# Patient Record
Sex: MALE | Race: BLACK OR AFRICAN AMERICAN | Employment: PART TIME | URBAN - METROPOLITAN AREA
[De-identification: names, ages, dates, MRNs, and addresses within clinical notes are randomized per-mention and may not be internally consistent; named-entity substitution may affect disease eponyms.]

---

## 2017-05-01 PROBLEM — D57.00 SICKLE CELL CRISIS (HCC): Status: ACTIVE | Noted: 2017-05-01

## 2017-08-04 PROBLEM — D57.00 SICKLE CELL PAIN CRISIS (HCC): Status: ACTIVE | Noted: 2017-08-04

## 2017-08-04 PROBLEM — M87.052 AVASCULAR NECROSIS OF BONE OF LEFT HIP (HCC): Status: ACTIVE | Noted: 2017-08-04

## 2018-05-31 PROBLEM — D57.00 SICKLE CELL ANEMIA WITH CRISIS (HCC): Status: ACTIVE | Noted: 2018-05-31

## 2019-03-18 ENCOUNTER — OFFICE VISIT (OUTPATIENT)
Dept: FAMILY MEDICINE CLINIC | Age: 24
End: 2019-03-18

## 2019-03-18 VITALS
OXYGEN SATURATION: 100 % | DIASTOLIC BLOOD PRESSURE: 66 MMHG | BODY MASS INDEX: 23.82 KG/M2 | WEIGHT: 143 LBS | HEART RATE: 82 BPM | HEIGHT: 65 IN | RESPIRATION RATE: 16 BRPM | TEMPERATURE: 98.7 F | SYSTOLIC BLOOD PRESSURE: 123 MMHG

## 2019-03-18 DIAGNOSIS — R21 SKIN RASH: ICD-10-CM

## 2019-03-18 DIAGNOSIS — R19.7 DIARRHEA, UNSPECIFIED TYPE: Primary | ICD-10-CM

## 2019-03-18 DIAGNOSIS — R19.7 DIARRHEA, UNSPECIFIED TYPE: ICD-10-CM

## 2019-03-18 DIAGNOSIS — M54.50 CHRONIC RIGHT-SIDED LOW BACK PAIN WITHOUT SCIATICA: ICD-10-CM

## 2019-03-18 DIAGNOSIS — Z11.4 ENCOUNTER FOR SCREENING FOR HIV: ICD-10-CM

## 2019-03-18 DIAGNOSIS — G89.29 CHRONIC RIGHT-SIDED LOW BACK PAIN WITHOUT SCIATICA: ICD-10-CM

## 2019-03-18 LAB
HIV -1/0/2 AG/AB WITH REFLEX, 13463: NON REACTIVE
HIV 1 & 2 AB SER-IMP: NORMAL

## 2019-03-18 RX ORDER — METHOCARBAMOL 500 MG/1
500 TABLET, FILM COATED ORAL 3 TIMES DAILY
Qty: 60 TAB | Refills: 0 | Status: SHIPPED | OUTPATIENT
Start: 2019-03-18 | End: 2019-06-18 | Stop reason: SDUPTHER

## 2019-03-18 RX ORDER — HYDROXYUREA 500 MG/1
500 CAPSULE ORAL DAILY
COMMUNITY
Start: 2019-03-04

## 2019-03-18 RX ORDER — LEVOCETIRIZINE DIHYDROCHLORIDE 5 MG/1
5 TABLET, FILM COATED ORAL DAILY
Qty: 30 TAB | Refills: 1 | Status: SHIPPED | OUTPATIENT
Start: 2019-03-18 | End: 2019-12-23

## 2019-03-18 RX ORDER — CLOBETASOL PROPIONATE 0.5 MG/G
OINTMENT TOPICAL 2 TIMES DAILY
Qty: 45 G | Refills: 0 | Status: SHIPPED | OUTPATIENT
Start: 2019-03-18 | End: 2019-12-23

## 2019-03-18 RX ORDER — NAPROXEN 500 MG/1
500 TABLET ORAL 2 TIMES DAILY WITH MEALS
Qty: 60 TAB | Refills: 0 | Status: SHIPPED | OUTPATIENT
Start: 2019-03-18 | End: 2019-12-23

## 2019-03-18 RX ORDER — FOLIC ACID 1 MG/1
1 TABLET ORAL DAILY
Refills: 4 | COMMUNITY
Start: 2019-02-28

## 2019-03-18 NOTE — PROGRESS NOTES
No chief complaint on file. Denny Velasquez is a 22y.o. year old male  With History of Sickle Cells Disease who present today with c/o Diarrhea x 1 month; has 4 to 5 liquid stools a day. Has cramps in the mornings. Denies blood in stools. States that it started out of nowhere. States that he wears long sleeves at work. Also c/o rash on Left Arm x two to three week. States that he does not remember been bitten by an insect. Rash itches a lot. States that it started as a small pimple and is now bigger. States that pain comes and goes. Denies injury. C/o Right lower back pain. This is a chronic problem. Pain is intermittent and does not radiate. Denies Injury. Sometimes sitting too long makes symptoms worse. Would like to be tested for HIV. Previous PCP: Has not had a PCP for a long time. Sees Dr. Catrachito Mcgarry, Hem/Onc for Sickle Cell Disease. Med review done  Medical Hx updated in the chart      Review of Systems:   Pt denies: Wt loss, Fever/Chills, HA, Visual changes, Fatigue, Chest pain, SOB, OH, Abd pain, N/V/D/C, Blood in stool or urine, Edema. Pertinent positive as above in HPI. All others negative. Current Outpatient Medications   Medication Sig Dispense Refill    hydroxyurea (HYDREA) 500 mg capsule       folic acid (FOLVITE) 1 mg tablet TAKE 1 TABLET ORALLY EVERY DAY. 4        There is no problem list on file for this patient. Past Medical History:   Diagnosis Date    Headache     Heart murmur 2013       History reviewed. No pertinent surgical history. No Known Allergies    Family History   Problem Relation Age of Onset    Diabetes Maternal Grandfather     Alzheimer Maternal Grandfather        Objective:     Vitals:    03/18/19 1511   BP: 123/66   Pulse: 82   Resp: 16   Temp: 98.7 °F (37.1 °C)   TempSrc: Oral   SpO2: 100%   Weight: 143 lb (64.9 kg)   Height: 5' 5\" (1.651 m)     Body mass index is 23.8 kg/m².     Exam:   Appearance: alert, well appearing, in no respiratory distress and well hydrated. HEENT:  NC/AT, TMs clear, Posterior Pharynx nl, pink conjunctivae, anicteric sclerae  Neck:  No cervical lymphadenopathy, no JVD, no thyromegaly  Heart:  RRR without M/R/G  Lungs:  CTAB, no rhonchi, rales, or wheezes with good air exchange  Abdomen: soft, normoactive BS, non tender, no organomegaly, no palpable mass   Ext:  No C/C/E   Skin: Large raised and red rash on inner aspect of left upper arm;   Neuro:AAO x 3, no focal deficits    Assessment/ Plan:     1. Diarrhea, unspecified type  - Stay hydrated. - CULTURE, STOOL; Future  - OVA & PARASITES, STOOL; Future    2. Skin rash  _ apply as prescribed twice a day right after shower. - clobetasol (TEMOVATE) 0.05 % ointment; Apply  to affected area two (2) times a day. Dispense: 45 g; Refill: 0  - CBC WITH AUTOMATED DIFF; Future  - METABOLIC PANEL, COMPREHENSIVE; Future  - SED RATE (ESR); Future  - levocetirizine (XYZAL) 5 mg tablet; Take 1 Tab by mouth daily. Dispense: 30 Tab; Refill: 1    3. Chronic right-sided low back pain without sciatica  - methocarbamol (ROBAXIN) 500 mg tablet; Take 1 Tab by mouth three (3) times daily. Dispense: 60 Tab; Refill: 0  - naproxen (NAPROSYN) 500 mg tablet; Take 1 Tab by mouth two (2) times daily (with meals). Dispense: 60 Tab; Refill: 0    4. Encounter for screening for HIV  - HIV 1/2 AG/AB, 4TH GENERATION,W RFLX CONFIRM; Future    I have discussed the diagnosis with the patient and the intended plan as seen in the above orders. The patient has received an after-visit summary and questions were answered concerning future plans. I have discussed medication side effects and warnings with the patient as well. I have reviewed the plan of care with the patient, accepted their input and they are in agreement with the treatment goals.      Follow-up Disposition: Not on File  Rosine Nadia Riedel, PARDEEP

## 2019-03-18 NOTE — PROGRESS NOTES
Alex Hedrick is a 22 y.o. male  Stomach pain with diarrhea  x 1 month , rash x 1 week on left arm, itchy. Also having lower back pain with no injury noted. Not seen a  PCP, but does go to hematologist. Dr. Emmie Nazario on [facility] oncology has appointment in may to See him. Dx Sickle cell.

## 2019-03-19 LAB
A-G RATIO,AGRAT: 2.2 RATIO (ref 1.1–2.6)
ABSOLUTE LYMPHOCYTE COUNT, 10803: 0.8 K/UL (ref 1–4.8)
ALBUMIN SERPL-MCNC: 5 G/DL (ref 3.5–5)
ALP SERPL-CCNC: 68 U/L (ref 25–115)
ALT SERPL-CCNC: 39 U/L (ref 5–40)
ANION GAP SERPL CALC-SCNC: 15 MMOL/L
ANISOCYTOSIS: ABNORMAL
AST SERPL W P-5'-P-CCNC: 34 U/L (ref 10–37)
BASOPHILS # BLD: 0 K/UL (ref 0–0.2)
BASOPHILS NFR BLD: 0 % (ref 0–2)
BILIRUB SERPL-MCNC: 0.8 MG/DL (ref 0.2–1.2)
BUN SERPL-MCNC: 11 MG/DL (ref 6–22)
CALCIUM SERPL-MCNC: 9.9 MG/DL (ref 8.4–10.4)
CHLORIDE SERPL-SCNC: 104 MMOL/L (ref 98–110)
CO2 SERPL-SCNC: 25 MMOL/L (ref 20–32)
CREAT SERPL-MCNC: 0.9 MG/DL (ref 0.5–1.2)
EOSINOPHIL # BLD: 0 K/UL (ref 0–0.5)
EOSINOPHIL NFR BLD: 1 % (ref 0–6)
ERYTHROCYTE [DISTWIDTH] IN BLOOD BY AUTOMATED COUNT: 18.8 % (ref 10–15.5)
GFRAA, 66117: >60
GFRNA, 66118: >60
GLOBULIN,GLOB: 2.3 G/DL (ref 2–4)
GLUCOSE SERPL-MCNC: 88 MG/DL (ref 70–99)
GRANULOCYTES,GRANS: 52 % (ref 40–75)
HCT VFR BLD AUTO: 33.9 % (ref 36.6–51.9)
HGB BLD-MCNC: 11.7 G/DL (ref 13.2–17.3)
LYMPHOCYTES, LYMLT: 33 % (ref 20–45)
MCH RBC QN AUTO: 32 PG (ref 26–34)
MCHC RBC AUTO-ENTMCNC: 35 G/DL (ref 31–36)
MCV RBC AUTO: 92 FL (ref 80–95)
MONOCYTES # BLD: 0.3 K/UL (ref 0.1–1)
MONOCYTES NFR BLD: 13 % (ref 3–12)
NEUTROPHILS # BLD AUTO: 1.3 K/UL (ref 1.8–7.7)
NORMACHROMIC RBC, 868: ABNORMAL
NORMACYTIC RBC, 869: ABNORMAL
OVALOCYTES, 1119: ABNORMAL
PATH REVIEW OF SMEAR, 12050: NORMAL
PLATELET # BLD AUTO: 94 K/UL (ref 140–440)
POTASSIUM SERPL-SCNC: 4.5 MMOL/L (ref 3.5–5.5)
PROT SERPL-MCNC: 7.3 G/DL (ref 6.4–8.3)
RBC # BLD AUTO: 3.69 M/UL (ref 3.8–5.8)
RBC FRAGMENTS, 1123: ABNORMAL
SED RATE (ESR): 8 MM/HR (ref 0–15)
SMEAR EVAL, 1131: ABNORMAL
SODIUM SERPL-SCNC: 144 MMOL/L (ref 133–145)
WBC # BLD AUTO: 2.4 K/UL (ref 4–11)

## 2019-03-20 LAB
RESULT: NORMAL
SHIGA TOXIN EIA, 16335: NEGATIVE

## 2019-03-21 LAB — RESULT: NORMAL

## 2019-03-25 LAB — RESULT: NORMAL

## 2019-03-27 NOTE — PROGRESS NOTES
Please let patient know that stool was negative for bacteria. I do not know why he is having diarrhea. If he wants and still have symptoms, he can be referred to GI. Please verify that Dr. Kate Pinto is his hematologist then fax CBC result to him. Thank you.

## 2019-04-19 ENCOUNTER — DOCUMENTATION ONLY (OUTPATIENT)
Dept: FAMILY MEDICINE CLINIC | Age: 24
End: 2019-04-19

## 2019-04-19 NOTE — PROGRESS NOTES
Patient called for lab results, results given to patient, patient voiced understanding. Informed patient of possible referral and patient declined but states that he is still experiencing diarrhea with blood in stool. Patient prefers to follow up with provider [facility] in June.

## 2019-06-16 DIAGNOSIS — R21 SKIN RASH: ICD-10-CM

## 2019-06-18 ENCOUNTER — OFFICE VISIT (OUTPATIENT)
Dept: FAMILY MEDICINE CLINIC | Age: 24
End: 2019-06-18

## 2019-06-18 VITALS
RESPIRATION RATE: 18 BRPM | SYSTOLIC BLOOD PRESSURE: 114 MMHG | HEART RATE: 68 BPM | TEMPERATURE: 98 F | OXYGEN SATURATION: 99 % | HEIGHT: 65 IN | DIASTOLIC BLOOD PRESSURE: 68 MMHG | WEIGHT: 142 LBS | BODY MASS INDEX: 23.66 KG/M2

## 2019-06-18 DIAGNOSIS — G89.29 CHRONIC RIGHT-SIDED LOW BACK PAIN WITHOUT SCIATICA: Primary | ICD-10-CM

## 2019-06-18 DIAGNOSIS — K92.1 BLOOD IN THE STOOL: ICD-10-CM

## 2019-06-18 DIAGNOSIS — R19.7 DIARRHEA, UNSPECIFIED TYPE: ICD-10-CM

## 2019-06-18 DIAGNOSIS — M54.50 CHRONIC RIGHT-SIDED LOW BACK PAIN WITHOUT SCIATICA: Primary | ICD-10-CM

## 2019-06-18 RX ORDER — METHOCARBAMOL 500 MG/1
1000 TABLET, FILM COATED ORAL 3 TIMES DAILY
Qty: 90 TAB | Refills: 0 | Status: SHIPPED | OUTPATIENT
Start: 2019-06-18

## 2019-06-18 NOTE — PATIENT INSTRUCTIONS
Diarrhea: Care Instructions  Your Care Instructions    Diarrhea is loose, watery stools (bowel movements). The exact cause is often hard to find. Sometimes diarrhea is your body's way of getting rid of what caused an upset stomach. Viruses, food poisoning, and many medicines can cause diarrhea. Some people get diarrhea in response to emotional stress, anxiety, or certain foods. Almost everyone has diarrhea now and then. It usually isn't serious, and your stools will return to normal soon. The important thing to do is replace the fluids you have lost, so you can prevent dehydration. The doctor has checked you carefully, but problems can develop later. If you notice any problems or new symptoms, get medical treatment right away. Follow-up care is a key part of your treatment and safety. Be sure to make and go to all appointments, and call your doctor if you are having problems. It's also a good idea to know your test results and keep a list of the medicines you take. How can you care for yourself at home? · Watch for signs of dehydration, which means your body has lost too much water. Dehydration is a serious condition and should be treated right away. Signs of dehydration are:  ? Increasing thirst and dry eyes and mouth. ? Feeling faint or lightheaded. ? Darker urine, and a smaller amount of urine than normal.  · To prevent dehydration, drink plenty of fluids, enough so that your urine is light yellow or clear like water. Choose water and other caffeine-free clear liquids until you feel better. If you have kidney, heart, or liver disease and have to limit fluids, talk with your doctor before you increase the amount of fluids you drink. · Begin eating small amounts of mild foods the next day, if you feel like it. ? Try yogurt that has live cultures of Lactobacillus. (Check the label.)  ? Avoid spicy foods, fruits, alcohol, and caffeine until 48 hours after all symptoms are gone. ?  Avoid chewing gum that contains sorbitol. ? Avoid dairy products (except for yogurt with Lactobacillus) while you have diarrhea and for 3 days after symptoms are gone. · The doctor may recommend that you take over-the-counter medicine, such as loperamide (Imodium), if you still have diarrhea after 6 hours. Read and follow all instructions on the label. Do not use this medicine if you have bloody diarrhea, a high fever, or other signs of serious illness. Call your doctor if you think you are having a problem with your medicine. When should you call for help? Call 911 anytime you think you may need emergency care. For example, call if:    · You passed out (lost consciousness).     · Your stools are maroon or very bloody.    Call your doctor now or seek immediate medical care if:    · You are dizzy or lightheaded, or you feel like you may faint.     · Your stools are black and look like tar, or they have streaks of blood.     · You have new or worse belly pain.     · You have symptoms of dehydration, such as:  ? Dry eyes and a dry mouth. ? Passing only a little dark urine. ? Feeling thirstier than usual.     · You have a new or higher fever.    Watch closely for changes in your health, and be sure to contact your doctor if:    · Your diarrhea is getting worse.     · You see pus in the diarrhea.     · You are not getting better after 2 days (48 hours). Where can you learn more? Go to http://rakesh-nohemi.info/. Enter Z186 in the search box to learn more about \"Diarrhea: Care Instructions. \"  Current as of: September 23, 2018  Content Version: 11.9  © 0405-0539 [employer]. Care instructions adapted under license by [employer] (which disclaims liability or warranty for this information).  If you have questions about a medical condition or this instruction, always ask your healthcare professional. [address] any warranty or liability for your use of this information. Upper Gastrointestinal Bleeding: Care Instructions  Your Care Instructions    The digestive or gastrointestinal tract goes from the mouth to the anus. It is often called the GI tract. Bleeding in the upper GI tract can happen anywhere from the esophagus to the first part of the small intestine. Sometimes it's caused by an ulcer in your stomach. Or it may be caused by blood vessels in your esophagus. Your esophagus is the tube that carries food from your throat to your stomach. Light bleeding may not cause any symptoms at first. But if you continue to bleed for a while, you may feel very weak or tired. Sudden, heavy bleeding means you need to see a doctor right away. This kind of bleeding can be very dangerous. But it can usually be cured or controlled. The doctor may do some tests to find the cause of your bleeding. Follow-up care is a key part of your treatment and safety. Be sure to make and go to all appointments, and call your doctor if you are having problems. It's also a good idea to know your test results and keep a list of the medicines you take. How can you care for yourself at home? · Be safe with medicines. Take your medicines exactly as prescribed. Call your doctor if you think you are having a problem with your medicine. You will get more details on the specific medicines your doctor prescribes. · Do not take aspirin or other anti-inflammatory medicines, such as naproxen (Aleve) or ibuprofen (Advil, Motrin), without talking to your doctor first. Ask your doctor if it is okay to use acetaminophen (Tylenol). · Do not drink alcohol. · The bleeding may make you lose iron. So it's important to eat foods that have a lot of iron. These include red meat, shellfish, poultry, and eggs. They also include beans, raisins, whole-grain breads, and leafy green vegetables. If you want help planning meals, you can meet with a dietitian. When should you call for help?   Call 911 anytime you think you may need emergency care. For example, call if:    · You have sudden, severe belly pain.     · You vomit blood or what looks like coffee grounds.     · You passed out (lost consciousness).     · Your stools are maroon or very bloody.    Call your doctor now or seek immediate medical care if:    · You are dizzy or lightheaded, or you feel like you may faint.     · Your stools are black and look like tar.     · You have belly pain.     · You vomit or have nausea.     · You have trouble swallowing, or it hurts when you swallow.    Watch closely for changes in your health, and be sure to contact your doctor if you do not get better as expected. Where can you learn more? Go to http://rakesh-nohemi.info/. Enter X388 in the search box to learn more about \"Upper Gastrointestinal Bleeding: Care Instructions. \"  Current as of: September 23, 2018  Content Version: 11.9  © 2675-7533 [employer]. Care instructions adapted under license by [employer] (which disclaims liability or warranty for this information). If you have questions about a medical condition or this instruction, always ask your healthcare professional. [address] any warranty or liability for your use of this information.

## 2019-06-23 NOTE — PROGRESS NOTES
Patient: Jolly Kruse  Date of Service: 6/23/2019   Provider: PARDEEP Black     REASON FOR VISIT:   Chief Complaint   Patient presents with    Diarrhea        HISTORY OF PRESENT ILLNESS:   Jolly Kruse is a 22 y.o. male who presents to the office for follow up for Diarrhea, intermittent black stools. All tests three months ago were negative. Symptoms are not worse than before. Also c/o Left back/hip pain. This is a chronic problem and is intermittent. Denies saddle paresthesia. Has a history of SCA and is followed by Dr. Sachin Johnson, Hematology. ALLERGIES:   No Known Allergies     ACTIVE MEDICAL PROBLEMS:  Patient Active Problem List   Diagnosis Code    Sickle cell crisis ([facility] D57.00    Sickle cell pain crisis ([facility] D57.00    Avascular necrosis of bone of left hip (HCC) M87.052    Sickle cell anemia with crisis ([facility] D57.00        MEDICATIONS:   Current Outpatient Medications   Medication Sig Dispense Refill    methocarbamol (ROBAXIN) 500 mg tablet Take 2 Tabs by mouth three (3) times daily. 90 Tab 0    hydroxyurea (HYDREA) 500 mg capsule       folic acid (FOLVITE) 1 mg tablet TAKE 1 TABLET ORALLY EVERY DAY. 4    naproxen (NAPROSYN) 500 mg tablet Take 1 Tab by mouth two (2) times daily (with meals). 60 Tab 0    clobetasol (TEMOVATE) 0.05 % ointment Apply  to affected area two (2) times a day. 45 g 0    levocetirizine (XYZAL) 5 mg tablet Take 1 Tab by mouth daily. 30 Tab 1    oxyCODONE IR (ROXICODONE) 20 mg immediate release tablet Take 1 Tab by mouth every eight (8) hours as needed for Pain. Max Daily Amount: 60 mg. 6 Tab 0    cyanocobalamin (VITAMIN B12) 2,500 mcg sublingual tablet Take 1 Tab by mouth daily. 100 Tab 3    ergocalciferol (ERGOCALCIFEROL) 50,000 unit capsule Take 1 Cap by mouth every seven (7) days. 4 Cap 3    senna-docusate (PERICOLACE) 8.6-50 mg per tablet Take 2 Tabs by mouth nightly.  60 Tab 3    polyethylene glycol (MIRALAX) 17 gram packet Take 1 Packet by mouth daily. 30 Packet 0    folic acid (FOLVITE) 1 mg tablet Take 1 mg by mouth daily.  famotidine (PEPCID) 20 mg tablet Take 1 Tab by mouth two (2) times a day. 60 Tab 0        ROS:   Pertinent positive as above in HPI. All others negative. PHYSICAL EXAMINATION:  Visit Vitals  /68 (BP 1 Location: Left arm, BP Patient Position: Sitting)   Pulse 68   Temp 98 °F (36.7 °C) (Oral)   Resp 18   Ht 5' 5\" (1.651 m)   Wt 142 lb (64.4 kg)   SpO2 99%   BMI 23.63 kg/m²      Body mass index is 23.63 kg/m². Appearance: alert, well appearing, and in no distress. Neck supple. No adenopathy or thyromegaly. Lungs are clear, good air entry, no wheezes, rhonchi or rales. S1 and S2 normal, no murmurs, regular rate and rhythm. Abdomen soft without tenderness, guarding, mass or organomegaly. No bruit. Extremities show no edema, normal peripheral pulses. Neurological is normal, no focal findings. ASSESSMENT/PLAN:  Diagnoses and all orders for this visit:    1. Chronic right-sided low back pain without sciatica  -     REFERRAL TO PHYSICAL THERAPY  -     methocarbamol (ROBAXIN) 500 mg tablet; Take 2 Tabs by mouth three (3) times daily. 2. Diarrhea, unspecified type  -     REFERRAL TO GASTROENTEROLOGY    3. Blood in the stool  -     REFERRAL TO GASTROENTEROLOGY    Will referred to Pain Management if pain is not helped by PT and Muscke relaxer. Patient advised to return to clinic if symptoms persist or to ED if they become worse  Patient verbalized understanding to above instructions. Follow-up and Dispositions    · Return if symptoms worsen or fail to improve.           PARDEEP Alfaro   6/23/2019   8:32 AM

## 2019-07-16 ENCOUNTER — APPOINTMENT (OUTPATIENT)
Dept: PHYSICAL THERAPY | Age: 24
End: 2019-07-16

## 2019-12-23 PROBLEM — R10.9 ABDOMINAL PAIN: Status: ACTIVE | Noted: 2019-12-23

## 2019-12-23 PROBLEM — R07.9 CHEST PAIN: Status: ACTIVE | Noted: 2019-12-23

## 2020-02-04 PROBLEM — R07.9 ACUTE CHEST PAIN: Status: ACTIVE | Noted: 2020-02-04

## 2020-02-09 PROBLEM — R07.9 ACUTE CHEST PAIN: Status: RESOLVED | Noted: 2020-02-04 | Resolved: 2020-02-09

## 2020-03-10 ENCOUNTER — HOSPITAL ENCOUNTER (EMERGENCY)
Age: 25
Discharge: LEFT BEFORE DISCHARGE | End: 2020-03-10
Payer: COMMERCIAL

## 2020-03-10 ENCOUNTER — APPOINTMENT (OUTPATIENT)
Dept: GENERAL RADIOLOGY | Age: 25
End: 2020-03-10
Attending: PHYSICIAN ASSISTANT
Payer: COMMERCIAL

## 2020-03-10 VITALS
SYSTOLIC BLOOD PRESSURE: 126 MMHG | TEMPERATURE: 98.6 F | DIASTOLIC BLOOD PRESSURE: 82 MMHG | HEART RATE: 77 BPM | RESPIRATION RATE: 16 BRPM | OXYGEN SATURATION: 100 %

## 2020-03-10 PROCEDURE — 75810000275 HC EMERGENCY DEPT VISIT NO LEVEL OF CARE

## 2020-03-10 PROCEDURE — 71045 X-RAY EXAM CHEST 1 VIEW: CPT

## 2020-03-10 PROCEDURE — 93005 ELECTROCARDIOGRAM TRACING: CPT

## 2020-03-11 PROBLEM — D57.1 SICKLE CELL ANEMIA (HCC): Status: ACTIVE | Noted: 2020-03-11

## 2020-03-11 LAB
ATRIAL RATE: 78 BPM
CALCULATED P AXIS, ECG09: 74 DEGREES
CALCULATED R AXIS, ECG10: 64 DEGREES
CALCULATED T AXIS, ECG11: 45 DEGREES
DIAGNOSIS, 93000: NORMAL
P-R INTERVAL, ECG05: 138 MS
Q-T INTERVAL, ECG07: 332 MS
QRS DURATION, ECG06: 78 MS
QTC CALCULATION (BEZET), ECG08: 378 MS
VENTRICULAR RATE, ECG03: 78 BPM

## 2020-04-29 ENCOUNTER — HOSPITAL ENCOUNTER (EMERGENCY)
Age: 25
Discharge: HOME | End: 2020-04-30
Attending: EMERGENCY MEDICINE
Payer: COMMERCIAL

## 2020-04-29 ENCOUNTER — APPOINTMENT (OUTPATIENT)
Dept: GENERAL RADIOLOGY | Age: 25
End: 2020-04-29
Attending: EMERGENCY MEDICINE
Payer: COMMERCIAL

## 2020-04-29 DIAGNOSIS — R07.82 INTERCOSTAL PAIN: Primary | ICD-10-CM

## 2020-04-29 LAB
ALBUMIN SERPL-MCNC: 4.2 G/DL (ref 3.4–5)
ALBUMIN/GLOB SERPL: 1.2 {RATIO} (ref 0.8–1.7)
ALP SERPL-CCNC: 71 U/L (ref 45–117)
ALT SERPL-CCNC: 21 U/L (ref 16–61)
ANION GAP SERPL CALC-SCNC: 9 MMOL/L (ref 3–18)
AST SERPL-CCNC: 23 U/L (ref 10–38)
BASOPHILS # BLD: 0 K/UL (ref 0–0.1)
BASOPHILS NFR BLD: 0 % (ref 0–2)
BILIRUB SERPL-MCNC: 0.7 MG/DL (ref 0.2–1)
BUN SERPL-MCNC: 9 MG/DL (ref 7–18)
BUN/CREAT SERPL: 9 (ref 12–20)
CALCIUM SERPL-MCNC: 8.5 MG/DL (ref 8.5–10.1)
CHLORIDE SERPL-SCNC: 109 MMOL/L (ref 100–111)
CK MB CFR SERPL CALC: NORMAL % (ref 0–4)
CK MB SERPL-MCNC: <1 NG/ML (ref 5–25)
CK SERPL-CCNC: 126 U/L (ref 39–308)
CO2 SERPL-SCNC: 25 MMOL/L (ref 21–32)
CREAT SERPL-MCNC: 0.96 MG/DL (ref 0.6–1.3)
DIFFERENTIAL METHOD BLD: ABNORMAL
EOSINOPHIL # BLD: 0.1 K/UL (ref 0–0.4)
EOSINOPHIL NFR BLD: 2 % (ref 0–5)
ERYTHROCYTE [DISTWIDTH] IN BLOOD BY AUTOMATED COUNT: 18.8 % (ref 11.6–14.5)
GLOBULIN SER CALC-MCNC: 3.5 G/DL (ref 2–4)
GLUCOSE SERPL-MCNC: 101 MG/DL (ref 74–99)
HCT VFR BLD AUTO: 28.8 % (ref 36–48)
HGB BLD-MCNC: 9.8 G/DL (ref 13–16)
LYMPHOCYTES # BLD: 0.8 K/UL (ref 0.9–3.6)
LYMPHOCYTES NFR BLD: 21 % (ref 21–52)
MCH RBC QN AUTO: 26.7 PG (ref 24–34)
MCHC RBC AUTO-ENTMCNC: 34 G/DL (ref 31–37)
MCV RBC AUTO: 78.5 FL (ref 74–97)
MONOCYTES # BLD: 0.3 K/UL (ref 0.05–1.2)
MONOCYTES NFR BLD: 7 % (ref 3–10)
NEUTS SEG # BLD: 2.4 K/UL (ref 1.8–8)
NEUTS SEG NFR BLD: 70 % (ref 40–73)
PLATELET # BLD AUTO: 109 K/UL (ref 135–420)
PLATELET COMMENTS,PCOM: ABNORMAL
POTASSIUM SERPL-SCNC: 3.7 MMOL/L (ref 3.5–5.5)
PROT SERPL-MCNC: 7.7 G/DL (ref 6.4–8.2)
RBC # BLD AUTO: 3.67 M/UL (ref 4.7–5.5)
RBC MORPH BLD: ABNORMAL
SODIUM SERPL-SCNC: 143 MMOL/L (ref 136–145)
TROPONIN I SERPL-MCNC: <0.02 NG/ML (ref 0–0.04)
WBC # BLD AUTO: 3.6 K/UL (ref 4.6–13.2)

## 2020-04-29 PROCEDURE — 74011250636 HC RX REV CODE- 250/636: Performed by: EMERGENCY MEDICINE

## 2020-04-29 PROCEDURE — 96374 THER/PROPH/DIAG INJ IV PUSH: CPT

## 2020-04-29 PROCEDURE — 85025 COMPLETE CBC W/AUTO DIFF WBC: CPT

## 2020-04-29 PROCEDURE — 93005 ELECTROCARDIOGRAM TRACING: CPT

## 2020-04-29 PROCEDURE — 80053 COMPREHEN METABOLIC PANEL: CPT

## 2020-04-29 PROCEDURE — 99284 EMERGENCY DEPT VISIT MOD MDM: CPT

## 2020-04-29 PROCEDURE — 71046 X-RAY EXAM CHEST 2 VIEWS: CPT

## 2020-04-29 PROCEDURE — 82550 ASSAY OF CK (CPK): CPT

## 2020-04-29 RX ORDER — KETOROLAC TROMETHAMINE 15 MG/ML
15 INJECTION, SOLUTION INTRAMUSCULAR; INTRAVENOUS ONCE
Status: COMPLETED | OUTPATIENT
Start: 2020-04-29 | End: 2020-04-29

## 2020-04-29 RX ADMIN — KETOROLAC TROMETHAMINE 15 MG: 15 INJECTION, SOLUTION INTRAMUSCULAR; INTRAVENOUS at 22:37

## 2020-04-29 NOTE — LETTER
[address] [place] [address] [address] [facility] EMERGENCY DEPT 
[id number] [facility] [id number] [phone] Work/School Note Date: 4/29/2020 To Whom It May concern: 
 
Jeri Richardson was seen and treated today in the emergency room by the following provider(s): 
Attending Provider: Gene José MD.   
 
Jeri Richardson may return to work on May 1, 2020. Sincerely, Sarika Sanches RN

## 2020-04-29 NOTE — ED TRIAGE NOTES
Patient states chest pain with difficulty breathing,  States It could be\" when I asked if it was his sickle cell,  States it is random

## 2020-04-30 VITALS
HEART RATE: 69 BPM | BODY MASS INDEX: 21.63 KG/M2 | WEIGHT: 130 LBS | TEMPERATURE: 97.8 F | DIASTOLIC BLOOD PRESSURE: 56 MMHG | OXYGEN SATURATION: 100 % | RESPIRATION RATE: 18 BRPM | SYSTOLIC BLOOD PRESSURE: 118 MMHG

## 2020-04-30 LAB
ATRIAL RATE: 92 BPM
CALCULATED P AXIS, ECG09: 81 DEGREES
CALCULATED R AXIS, ECG10: 56 DEGREES
CALCULATED T AXIS, ECG11: 31 DEGREES
DIAGNOSIS, 93000: NORMAL
P-R INTERVAL, ECG05: 128 MS
Q-T INTERVAL, ECG07: 334 MS
QRS DURATION, ECG06: 70 MS
QTC CALCULATION (BEZET), ECG08: 413 MS
VENTRICULAR RATE, ECG03: 92 BPM

## 2020-04-30 NOTE — ED PROVIDER NOTES
EMERGENCY DEPARTMENT HISTORY AND PHYSICAL EXAM    6:28 AM  Date: 4/29/2020  Patient Name: Gladstone Paget    History of Presenting Illness     Chief Complaint   Patient presents with    Chest Pain        History Provided By: Patient    HPI: Gladstone Paget is a 26 y.o. male with history of sickle cell. Patient is presenting with recurrent chest pain and tenderness to palpation. No history of shortness of breath, no fever cough or chills. Patient was evaluated multiple times in the past for his chest pain and was told is not cardiac. No PE risk factors. 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 No pertinent surgical history. Family History:  Family History   Problem Relation Age of Onset    Diabetes Maternal Grandfather     Alzheimer Maternal Grandfather     Sickle Cell Trait Mother     Sickle Cell Trait Father        Social History:  Social History     Tobacco Use    Smoking status: Never Smoker    Smokeless tobacco: Never Used   Substance Use Topics    Alcohol use: No     Frequency: Never    Drug use: No       Allergies:  No Known Allergies    Review of Systems   Review of Systems   Cardiovascular: Positive for chest pain. All other systems reviewed and are negative. Physical Exam     Patient Vitals for the past 12 hrs:   Temp Pulse Resp BP SpO2   04/30/20 0030 97.8 °F (36.6 °C) 69 18 118/56 100 %   04/29/20 1959 98.4 °F (36.9 °C) 87 19 114/64 100 %       Physical Exam  Vitals signs and nursing note reviewed. Constitutional:       Appearance: He is well-developed. HENT:      Head: Normocephalic and atraumatic.    Eyes:      Extraocular Movements: Extraocular movements intact. Neck:      Musculoskeletal: Neck supple. Cardiovascular:      Rate and Rhythm: Normal rate. Pulmonary:      Effort: Pulmonary effort is normal. No tachypnea. Chest:      Chest wall: Tenderness present. Musculoskeletal: Normal range of motion. Skin:     General: Skin is warm and dry. Neurological:      General: No focal deficit present. Mental Status: He is alert and oriented to person, place, and time.    Psychiatric:         Mood and Affect: Mood normal.         Behavior: Behavior normal.         Diagnostic Study Results     Labs -  Recent Results (from the past 12 hour(s))   EKG, 12 LEAD, INITIAL    Collection Time: 04/29/20  7:40 PM   Result Value Ref Range    Ventricular Rate 92 BPM    Atrial Rate 92 BPM    P-R Interval 128 ms    QRS Duration 70 ms    Q-T Interval 334 ms    QTC Calculation (Bezet) 413 ms    Calculated P Axis 81 degrees    Calculated R Axis 56 degrees    Calculated T Axis 31 degrees    Diagnosis       Normal sinus rhythm  Voltage criteria for left ventricular hypertrophy  Cannot rule out Septal infarct , age undetermined  Abnormal ECG  When compared with ECG of 10-MAR-2020 16:29,  Minimal criteria for Septal infarct are now present     CARDIAC PANEL,(CK, CKMB & TROPONIN)    Collection Time: 04/29/20  8:59 PM   Result Value Ref Range     39 - 308 U/L    CK - MB <1.0 <3.6 ng/ml    CK-MB Index  0.0 - 4.0 %     CALCULATION NOT PERFORMED WHEN RESULT IS BELOW LINEAR LIMIT    Troponin-I, QT <0.02 0.0 - 3.468 NG/ML   METABOLIC PANEL, COMPREHENSIVE    Collection Time: 04/29/20  8:59 PM   Result Value Ref Range    Sodium 143 136 - 145 mmol/L    Potassium 3.7 3.5 - 5.5 mmol/L    Chloride 109 100 - 111 mmol/L    CO2 25 21 - 32 mmol/L    Anion gap 9 3.0 - 18 mmol/L    Glucose 101 (H) 74 - 99 mg/dL    BUN 9 7.0 - 18 MG/DL    Creatinine 0.96 0.6 - 1.3 MG/DL    BUN/Creatinine ratio 9 (L) 12 - 20      GFR est AA >60 >60 ml/min/1.73m2    GFR est non-AA >60 >60 ml/min/1.73m2    Calcium 8.5 8.5 - 10.1 MG/DL    Bilirubin, total 0.7 0.2 - 1.0 MG/DL    ALT (SGPT) 21 16 - 61 U/L    AST (SGOT) 23 10 - 38 U/L    Alk. phosphatase 71 45 - 117 U/L    Protein, total 7.7 6.4 - 8.2 g/dL    Albumin 4.2 3.4 - 5.0 g/dL    Globulin 3.5 2.0 - 4.0 g/dL    A-G Ratio 1.2 0.8 - 1.7     CBC WITH AUTOMATED DIFF    Collection Time: 04/29/20  9:32 PM   Result Value Ref Range    WBC 3.6 (L) 4.6 - 13.2 K/uL    RBC 3.67 (L) 4.70 - 5.50 M/uL    HGB 9.8 (L) 13.0 - 16.0 g/dL    HCT 28.8 (L) 36.0 - 48.0 %    MCV 78.5 74.0 - 97.0 FL    MCH 26.7 24.0 - 34.0 PG    MCHC 34.0 31.0 - 37.0 g/dL    RDW 18.8 (H) 11.6 - 14.5 %    PLATELET 556 (L) 133 - 420 K/uL    NEUTROPHILS 70 40 - 73 %    LYMPHOCYTES 21 21 - 52 %    MONOCYTES 7 3 - 10 %    EOSINOPHILS 2 0 - 5 %    BASOPHILS 0 0 - 2 %    ABS. NEUTROPHILS 2.4 1.8 - 8.0 K/UL    ABS. LYMPHOCYTES 0.8 (L) 0.9 - 3.6 K/UL    ABS. MONOCYTES 0.3 0.05 - 1.2 K/UL    ABS. EOSINOPHILS 0.1 0.0 - 0.4 K/UL    ABS. BASOPHILS 0.0 0.0 - 0.1 K/UL    DF AUTOMATED      PLATELET COMMENTS DECREASED PLATELETS      RBC COMMENTS ANISOCYTOSIS  1+        RBC COMMENTS POLYCHROMASIA  1+        RBC COMMENTS POIKILOCYTOSIS  1+        RBC COMMENTS TARGET CELLS  1+        RBC COMMENTS OVALOCYTES  1+           Radiologic Studies -   No results found. Medical Decision Making     ED Course: Progress Notes, Reevaluation, and Consults:    6:28 AM Initial assessment performed. The patients presenting problems have been discussed, and they/their family are in agreement with the care plan formulated and outlined with them. I have encouraged them to ask questions as they arise throughout their visit. Provider Notes (Medical Decision Making): 27-year-old male with history of sickle cell presenting with atypical chest pain. No concern for cardiac or infectious processes. However given the fact that he has sickle cell will get screening labs and chest x-ray. Toradol for pain.     Procedures: Critical Care Time:     Vital Signs-Reviewed the patient's vital signs. Reviewed pt's pulse ox reading. EKG: Interpreted by the EP. Time Interpreted:    Rate:    Rhythm:    Interpretation:   Comparison:     Records Reviewed: Nursing Notes (Time of Review: 6:28 AM)  -I am the first provider for this patient.  -I reviewed the vital signs, available nursing notes, past medical history, past surgical history, family history and social history. Current Outpatient Medications   Medication Sig Dispense Refill    oxyCODONE IR (OXY-IR) 15 mg immediate release tablet Take 15 mg by mouth every four (4) hours as needed for Pain.  naloxone (NARCAN) 4 mg/actuation nasal spray 1 Cornelius by IntraNASal route as needed (Narcotic oversedation). Use 1 spray intranasally, then discard. Repeat with new spray every 2 min if needed for opioid overdose symptoms, alternating nostrils. Indications: decrease in rate & depth of breathing due to opioid drug 2 Each 1    docusate sodium (COLACE) 100 mg capsule Take 1 Cap by mouth two (2) times daily as needed for Constipation. Indications: constipation 60 Cap 0    polyethylene glycol (MIRALAX) 17 gram packet Take 1 Packet by mouth daily as needed for Constipation. Indications: constipation 1 Each 0    senna (SENOKOT) 8.6 mg tablet Take 1 Tab by mouth nightly. Indications: constipation 30 Tab 0    pantoprazole (PROTONIX) 40 mg tablet Take 40 mg by mouth daily.  methocarbamol (ROBAXIN) 500 mg tablet Take 2 Tabs by mouth three (3) times daily. 90 Tab 0    hydroxyurea (HYDREA) 500 mg capsule Take 500 mg by mouth daily.  folic acid (FOLVITE) 1 mg tablet Take 1 mg by mouth daily. 4    cyanocobalamin (VITAMIN B12) 2,500 mcg sublingual tablet Take 1 Tab by mouth daily. 100 Tab 3    ergocalciferol (ERGOCALCIFEROL) 50,000 unit capsule Take 1 Cap by mouth every seven (7) days. 4 Cap 3        Clinical Impression     Clinical Impression:   1.  Intercostal pain Disposition: DC      This note was dictated utilizing voice recognition software which may lead to typographical errors. I apologize in advance if the situation occurs. If questions arise please do not hesitate to contact me or call our department.     Marcell Petit MD  6:28 AM

## 2020-04-30 NOTE — DISCHARGE INSTRUCTIONS

## 2020-09-22 PROBLEM — D57.00 SICKLE CELL ANEMIA WITH PAIN (HCC): Status: ACTIVE | Noted: 2020-09-22

## 2022-03-18 PROBLEM — R07.9 CHEST PAIN: Status: ACTIVE | Noted: 2019-12-23

## 2022-03-18 PROBLEM — M87.052 AVASCULAR NECROSIS OF BONE OF LEFT HIP (HCC): Status: ACTIVE | Noted: 2017-08-04

## 2022-03-18 PROBLEM — D57.00 SICKLE CELL PAIN CRISIS (HCC): Status: ACTIVE | Noted: 2017-08-04

## 2022-03-18 PROBLEM — D57.1 SICKLE CELL ANEMIA (HCC): Status: ACTIVE | Noted: 2020-03-11

## 2022-03-18 PROBLEM — R10.9 ABDOMINAL PAIN: Status: ACTIVE | Noted: 2019-12-23

## 2022-03-19 PROBLEM — D57.00 SICKLE CELL ANEMIA WITH PAIN (HCC): Status: ACTIVE | Noted: 2020-09-22

## 2022-03-19 PROBLEM — D57.00 SICKLE CELL CRISIS (HCC): Status: ACTIVE | Noted: 2017-05-01

## 2022-03-20 PROBLEM — D57.00 SICKLE CELL ANEMIA WITH CRISIS (HCC): Status: ACTIVE | Noted: 2018-05-31

## 2023-01-31 RX ORDER — METHOCARBAMOL 500 MG/1
1000 TABLET, FILM COATED ORAL 3 TIMES DAILY
COMMUNITY
Start: 2019-06-18

## 2023-01-31 RX ORDER — NALOXONE HYDROCHLORIDE 4 MG/.1ML
SPRAY NASAL
COMMUNITY
Start: 2020-02-09

## 2023-01-31 RX ORDER — VITAMIN B COMPLEX
2500 TABLET ORAL DAILY
COMMUNITY
Start: 2018-03-25

## 2023-01-31 RX ORDER — PANTOPRAZOLE SODIUM 40 MG/1
40 TABLET, DELAYED RELEASE ORAL DAILY
COMMUNITY

## 2023-01-31 RX ORDER — PSEUDOEPHEDRINE HCL 30 MG
100 TABLET ORAL 2 TIMES DAILY PRN
COMMUNITY
Start: 2020-02-09

## 2023-01-31 RX ORDER — HYDROXYUREA 500 MG/1
500 CAPSULE ORAL DAILY
COMMUNITY
Start: 2019-03-04

## 2023-01-31 RX ORDER — ERGOCALCIFEROL 1.25 MG/1
50000 CAPSULE ORAL
COMMUNITY
Start: 2018-03-28

## 2023-01-31 RX ORDER — FOLIC ACID 1 MG/1
1 TABLET ORAL DAILY
COMMUNITY
Start: 2019-02-28